# Patient Record
Sex: MALE | Race: WHITE | NOT HISPANIC OR LATINO | URBAN - METROPOLITAN AREA
[De-identification: names, ages, dates, MRNs, and addresses within clinical notes are randomized per-mention and may not be internally consistent; named-entity substitution may affect disease eponyms.]

---

## 2017-05-10 PROBLEM — Z00.00 ENCOUNTER FOR PREVENTIVE HEALTH EXAMINATION: Status: ACTIVE | Noted: 2017-05-10

## 2017-05-17 ENCOUNTER — OUTPATIENT (OUTPATIENT)
Dept: OUTPATIENT SERVICES | Facility: HOSPITAL | Age: 57
LOS: 1 days | End: 2017-05-17
Payer: COMMERCIAL

## 2017-05-17 ENCOUNTER — APPOINTMENT (OUTPATIENT)
Dept: ULTRASOUND IMAGING | Facility: CLINIC | Age: 57
End: 2017-05-17

## 2017-05-17 DIAGNOSIS — Z00.8 ENCOUNTER FOR OTHER GENERAL EXAMINATION: ICD-10-CM

## 2017-05-17 PROCEDURE — 76536 US EXAM OF HEAD AND NECK: CPT

## 2022-01-16 ENCOUNTER — TRANSCRIPTION ENCOUNTER (OUTPATIENT)
Age: 62
End: 2022-01-16

## 2022-02-02 ENCOUNTER — OUTPATIENT (OUTPATIENT)
Dept: OUTPATIENT SERVICES | Facility: HOSPITAL | Age: 62
LOS: 1 days | End: 2022-02-02
Payer: SELF-PAY

## 2022-02-02 ENCOUNTER — APPOINTMENT (OUTPATIENT)
Dept: CT IMAGING | Facility: CLINIC | Age: 62
End: 2022-02-02
Payer: SELF-PAY

## 2022-02-02 DIAGNOSIS — Z00.8 ENCOUNTER FOR OTHER GENERAL EXAMINATION: ICD-10-CM

## 2022-02-02 PROCEDURE — 75571 CT HRT W/O DYE W/CA TEST: CPT | Mod: 26

## 2022-02-02 PROCEDURE — 75571 CT HRT W/O DYE W/CA TEST: CPT

## 2023-07-05 ENCOUNTER — NON-APPOINTMENT (OUTPATIENT)
Age: 63
End: 2023-07-05

## 2023-07-06 ENCOUNTER — APPOINTMENT (OUTPATIENT)
Dept: ORTHOPEDIC SURGERY | Facility: CLINIC | Age: 63
End: 2023-07-06
Payer: COMMERCIAL

## 2023-07-06 ENCOUNTER — NON-APPOINTMENT (OUTPATIENT)
Age: 63
End: 2023-07-06

## 2023-07-06 VITALS — WEIGHT: 180 LBS | HEIGHT: 68 IN | BODY MASS INDEX: 27.28 KG/M2

## 2023-07-06 VITALS — HEART RATE: 91 BPM | SYSTOLIC BLOOD PRESSURE: 162 MMHG | DIASTOLIC BLOOD PRESSURE: 71 MMHG

## 2023-07-06 DIAGNOSIS — G56.01 CARPAL TUNNEL SYNDROME, RIGHT UPPER LIMB: ICD-10-CM

## 2023-07-06 DIAGNOSIS — Z78.9 OTHER SPECIFIED HEALTH STATUS: ICD-10-CM

## 2023-07-06 DIAGNOSIS — G56.02 CARPAL TUNNEL SYNDROME, LEFT UPPER LIMB: ICD-10-CM

## 2023-07-06 PROCEDURE — 99203 OFFICE O/P NEW LOW 30 MIN: CPT

## 2023-07-08 RX ORDER — LOSARTAN POTASSIUM 100 MG/1
TABLET, FILM COATED ORAL
Refills: 0 | Status: ACTIVE | COMMUNITY

## 2023-07-08 RX ORDER — ROSUVASTATIN CALCIUM 5 MG/1
TABLET, FILM COATED ORAL
Refills: 0 | Status: ACTIVE | COMMUNITY

## 2023-07-08 RX ORDER — AMLODIPINE BESYLATE 5 MG/1
TABLET ORAL
Refills: 0 | Status: ACTIVE | COMMUNITY

## 2023-07-08 NOTE — DISCUSSION/SUMMARY
[FreeTextEntry1] : Patient has numbness and tingling primarily on the right occasionally on the left in the median distribution..\par History and physical findings are more likely suggestive of carpal tunnel syndrome, although cervical radiculopathy is a consideration.\par I reviewed diagnostic possibilities, treatment alternatives, work-up options with patient.\par Patient recommended to have, and has accepted electrodiagnostic studies to confirm or rule out carpal tunnel syndrome. \par Following this the patient should return to review results and to formulate additional treatment plan.\par Until diagnosis is established, prognosis uncertain.\par Patient agrees with and accepts the above plan.\par No other active hand problem identified requiring intervention at this time.\par \par A lengthy and detailed discussion was held with the patient regarding analysis, treatment, and recommendations. All questions have been answered. At the conclusion the patient expressed acceptance, understanding and agreement with the plan.

## 2023-07-08 NOTE — HISTORY OF PRESENT ILLNESS
[FreeTextEntry1] : The patient is a 63 yo RHD male who presents for NEW PATIENT hand evaluation\par Pt owns construction company, commercial buildings.\par Exterior building restoration.\par \par Pt does a lot of boating and fishing.\par 5 yrs ago developed numbness tingling when on lengthy boat ride.\par Subsequently increasing episodes of numbness primarily in right hand.\par Infrequently in left, brief duration.\par Pt has numbness at night 2-3-4 x per wk on right.\par Holding phone can have exacerbations.\par \par Unrelated to above patient reports that he was injured by fish scale which stabbed him beneath right long finger nail.\par Patient reports altered sensation in the tip of the right long finger ever since that is constant and unchanging.\par "never" in little finger.\par \par Pt worked up by cardiologist, and was cleared as being "ok".\par No neck radiation.\par Pt has 3 power boats.

## 2023-07-08 NOTE — CONSULT LETTER
[Dear  ___] : Dear  [unfilled], [Consult Letter:] : I had the pleasure of evaluating your patient, [unfilled]. [FreeTextEntry1] : The patient was seen  in hand consultation today.  Most likely diagnosis is bilateral carpal tunnel syndrome.  Patient has been referred to have electrodiagnostic studies and will return after the studies have been performed to review results and to formulate additional treatment plan.  A copy of my office note is enclosed for your review with the patient's knowledge and consent.\par \par Sincerely,\par \par Tavo Murillo MD\par Chief, Hand Surgery\par Residency \par Department of Orthopaedic Surgery \par [facility]\par Professor of Orthopaedic Surgery\par [facility] at [facility]

## 2023-07-08 NOTE — PHYSICAL EXAM
[de-identified] : Right elbow:\par E/F -15/135 without pain.\par No tenderness medial or lateral epicondyles.\par Good stability.\par Right forearm\par Although patient describes pain in the extensor portion right forearm no positive findings\par No tenderness radial tunnel compression\par Right wrist E/F 55/55 without pain\par No tenderness\par Basal joint\par Slight crepitus mild discomfort\par Right hand\par No A1 pulley tenderness and no triggering in any finger.\par No pertinent MP, PIP, or DIP joint contributory findings, except some Heberden's nodes; none are clinically painful.\par \par Left wrist:\par Good motion without localizable wrist joint findings.\par Basal joint manipulation slight crepitus, minimal pain.\par \par Left hand\par Basal joint no pain or crepitus\par No A1 pulley tenderness and no triggering in any finger.\par No pertinent MP, PIP, or DIP joint contributory findings, except some Heberden's nodes; none are clinically painful.\par \par Neurologic:\par Right hand: \par Long finger slightly altered sensation long finger hyponychium from fishing injury\par Normal sensation at rest otherwise in all other fingers\par Phalen's test with median nerve compression: Minimal if any change\par Radial nerve motor and sensory and ulnar nerve motor and sensory are intact.\par \par Left hand\par Normal sensation at rest\par Phalen's test with median nerve compression: Negative \par Radial nerve motor and sensory and ulnar nerve motor and sensory are intact, bilaterally.\par \par Skin: Scattered ecchymosis and abrasions from working on boat engine.  \par No cyanosis, clubbing, edema or rashes.\par Vascular: Radial pulses intact.\par Lymphatic: No streaking or epitrochlear adenopathy.\par The patient is awake, alert, and oriented. Affect appropriate. Cooperative.

## 2023-10-31 ENCOUNTER — APPOINTMENT (OUTPATIENT)
Dept: ORTHOPEDIC SURGERY | Facility: CLINIC | Age: 63
End: 2023-10-31
Payer: COMMERCIAL

## 2023-10-31 DIAGNOSIS — S86.811A STRAIN OF OTHER MUSCLE(S) AND TENDON(S) AT LOWER LEG LEVEL, RIGHT LEG, INITIAL ENCOUNTER: ICD-10-CM

## 2023-10-31 PROCEDURE — 73564 X-RAY EXAM KNEE 4 OR MORE: CPT | Mod: RT

## 2023-10-31 PROCEDURE — 20610 DRAIN/INJ JOINT/BURSA W/O US: CPT | Mod: RT

## 2023-10-31 PROCEDURE — 99214 OFFICE O/P EST MOD 30 MIN: CPT | Mod: 25

## 2024-04-23 ENCOUNTER — APPOINTMENT (OUTPATIENT)
Dept: ORTHOPEDIC SURGERY | Facility: CLINIC | Age: 64
End: 2024-04-23
Payer: COMMERCIAL

## 2024-04-23 VITALS — WEIGHT: 180 LBS | BODY MASS INDEX: 27.28 KG/M2 | HEIGHT: 68 IN

## 2024-04-23 DIAGNOSIS — M25.561 PAIN IN RIGHT KNEE: ICD-10-CM

## 2024-04-23 PROCEDURE — 99214 OFFICE O/P EST MOD 30 MIN: CPT | Mod: 25

## 2024-04-23 PROCEDURE — 20610 DRAIN/INJ JOINT/BURSA W/O US: CPT | Mod: RT

## 2024-04-23 RX ORDER — MELOXICAM 15 MG/1
15 TABLET ORAL
Qty: 30 | Refills: 1 | Status: ACTIVE | COMMUNITY
Start: 2023-10-31 | End: 1900-01-01

## 2024-04-23 NOTE — PHYSICAL EXAM
[de-identified] : right knee/lower leg exam  Skin: Clean, dry, intact Inspection: No obvious malalignment, no masses, no swelling, no effusion. Tenderness: +ttp proximal gastroc, no MJLT. No LJLT. No tenderness over the medial and lateral patella facets. No ttp medial/lateral epicondyle, patella tendon, tibial tubercle, pes anserinus, or proximal fibula. ROM: 0 to 140 no pain with deep flexion  Stability: Stable to varus, valgus, lachman testing. Negative anterior/posterior drawer. Additional tests: Negative McMurrays test, Negative patellar grind test.  Strength: 5/5 Q/H/TA/GS/EHL, no atrophy Neuro: In tact to light touch throughout in dp/sp/tib/jacy/saph nerve districutions, DTR's normal Pulses: 2+ DP/PT pulses. [de-identified] :  The following radiographs were ordered and read by me during this patients visit. I reviewed each radiograph in detail with the patient and discussed the findings as highlighted below.   4 views right knee were obtained today there is mild arthrosis slight joint space narrowing no fracture

## 2024-04-23 NOTE — DISCUSSION/SUMMARY
[de-identified] : 63-year-old male mild right knee arthrosis with posterior knee pain likely Baker's cyst.  I discussed the treatment of degenerative arthritis with the patient at length today. I described the spectrum of treatment from nonoperative modalities to total joint arthroplasty. Noninvasive and nonoperative treatment modalities include weight reduction, activity modification with low impact exercise, PRN use of acetaminophen or anti-inflammatory medication if tolerated, glucosamine/chondroitin supplements, and physical therapy. Further treatments can include corticosteroid injection and the use of hyaluronic acid injections. Definitive treatment can certainly include total joint arthroplasty also. The risks and benefits of each treatment options was discussed and all questions were answered.  Given prescription for Mobic side effects discussed he requested a corticosteroid injection today.  Injection: Right knee joint. Indication: Baker's cyst.  A discussion was had with the patient regarding this procedure and all questions were answered. All risks, benefits and alternatives were discussed. These included but were not limited to bleeding, infection, and allergic reaction. Alcohol was used to clean the skin, and betadine was used to sterilize and prep the area in the supero-lateral aspect of the right knee. Ethyl chloride spray was then used as a topical anesthetic. A 21-gauge needle was used to inject 4cc of 1% lidocaine and 1cc of 40mg/ml methylprednisolone into the knee. A sterile bandage was then applied. The patient tolerated the procedure well and there were no complications.  All questions answered follow-up as needed if not improved consider MRI.

## 2024-04-23 NOTE — HISTORY OF PRESENT ILLNESS
[de-identified] : 64yo male presents complaining of right upper calf and right posterior knee pain for couple of months.  Recently exacerbated on his boat fishing.  Walks a lot in the city this worsens his symptoms.  He uses a lot of stairs and steps.  No specific traumas that started this.  No swelling or erythema.  Trying to rest, using ice.  No instability or mechanical symptoms.  Pain better with rest.  Denies numbness tingling  He underwent injection last visit with significant relief.  His pain is since returned.  He is here today to review additional treatment options

## 2025-03-17 ENCOUNTER — TRANSCRIPTION ENCOUNTER (OUTPATIENT)
Age: 65
End: 2025-03-17

## 2025-03-18 ENCOUNTER — APPOINTMENT (OUTPATIENT)
Dept: ORTHOPEDIC SURGERY | Facility: CLINIC | Age: 65
End: 2025-03-18

## 2025-05-06 ENCOUNTER — APPOINTMENT (OUTPATIENT)
Dept: NEUROLOGY | Facility: CLINIC | Age: 65
End: 2025-05-06